# Patient Record
Sex: MALE | Race: WHITE
[De-identification: names, ages, dates, MRNs, and addresses within clinical notes are randomized per-mention and may not be internally consistent; named-entity substitution may affect disease eponyms.]

---

## 2021-12-30 ENCOUNTER — HOSPITAL ENCOUNTER (EMERGENCY)
Dept: HOSPITAL 43 - DL.ED | Age: 71
Discharge: NURSING, REHAB OR LONG-TERM CARE | End: 2021-12-30
Payer: MEDICARE

## 2021-12-30 DIAGNOSIS — S68.111A: Primary | ICD-10-CM

## 2021-12-30 DIAGNOSIS — Z23: ICD-10-CM
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
## 2021-12-30 NOTE — CR
PROCEDURE INFORMATION: 

Exam: XR Left Finger(s) 

Exam date and time: 12/30/2021 7:57 AM 

Age: 71 years old 

Clinical indication: Got finger caught in a  



TECHNIQUE: 

Imaging protocol: XR Left fingers. 

Views: Minimum 2 views. 



COMPARISON: 

No relevant prior studies available. 



FINDINGS: 

Bones/joints: There is been amputation of the tip of the index finger with loss 

of the distal aspect of the distal phalanx and associated soft tissue. There is 

multifocal osteoarthritis. 

Soft tissues: See above. 



IMPRESSION: 

1. Amputation of the distal index finger. 

2. Osteoarthritis.

## 2021-12-30 NOTE — EDM.PDOC
ED HPI GENERAL MEDICAL PROBLEM





- General


Stated Complaint: CUT ENTIRE TOP OF FINGER OFF


Time Seen by Provider: 12/30/21 07:50


Source of Information: Reports: Patient


History Limitations: Reports: No Limitations





- History of Present Illness


INITIAL COMMENTS - FREE TEXT/NARRATIVE: 





This 72 yo male patient reports to the ED due to getting his left 2nd finger 

caught in the . The patient was grinding deer meat to make sausage 

today. The patient noticed that there was a piece of meat caught in the .

As he was attempting to get the piece of meat to go through the , his 

finger got caught. The patient had the wound covered with the bleeding 

controlled upon arrival in the ED. 


Onset: Today


Duration: Minutes:


Location: Reports: Upper Extremity, Left


Quality: Reports: Ache, Dull


Severity: Moderate


Improves with: Reports: None


Worsens with: Reports: None


Context: Reports: Activity





- Related Data


                                    Allergies











Allergy/AdvReac Type Severity Reaction Status Date / Time


 


No Known Allergies Allergy   Verified 12/30/21 07:59











Home Meds: 


                                    Home Meds





. [Unable to Verify Home Med List]  12/30/21 [History]











Review of Systems





- Review of Systems


Review Of Systems: Comprehensive ROS is negative, except as noted in HPI.





ED EXAM, GENERAL





- Physical Exam


Exam: See Below


Exam Limited By: No Limitations


General Appearance: Alert, WD/WN, Mild Distress


Eye Exam: Bilateral Eye: EOMI, Normal Inspection, PERRL


Ears: Normal External Exam, Normal Canal, Hearing Grossly Normal, Normal TMs


Nose: Normal Inspection, Normal Mucosa, No Blood


Throat/Mouth: Normal Inspection, Normal Lips, Normal Teeth, Normal Gums, Normal 

Oropharynx, Normal Voice, No Airway Compromise


Head: Atraumatic, Normocephalic


Neck: Normal Inspection, Supple, Non-Tender, Full Range of Motion


Respiratory/Chest: No Respiratory Distress, Lungs Clear, Normal Breath Sounds, 

No Accessory Muscle Use, Chest Non-Tender


Cardiovascular: Normal Peripheral Pulses, Regular Rate, Rhythm, No Edema, No 

Gallop, No JVD, No Murmur, No Rub


GI/Abdominal: Normal Bowel Sounds


 (Male) Exam: Deferred


Rectal (Males) Exam: Deferred


Back Exam: Normal Inspection, Full Range of Motion, NT


Extremities: Arm Pain (left distal 2nd finger amputation)


Neurological: Alert, Oriented, CN II-XII Intact, Normal Cognition, Normal Gait, 

Normal Reflexes, No Motor/Sensory Deficits


Psychiatric: Normal Affect, Normal Mood


Skin Exam: Other (Amputation of left distal 2nd finger. )


Lymphatic: No Adenopathy





Course





- Vital Signs


Last Recorded V/S: 


                                Last Vital Signs











Temp  97.3 F   12/30/21 07:45


 


Pulse  84   12/30/21 07:45


 


Resp  18   12/30/21 07:45


 


BP  127/99 H  12/30/21 07:45


 


Pulse Ox  98   12/30/21 07:45














- Orders/Labs/Meds


Orders: 


                               Active Orders 24 hr











 Category Date Time Status


 


 Vaccine to be Administered/Admin Charge [RC] ASDIRECTED Care  12/30/21 07:59 

Ordered











Meds: 


Medications














Discontinued Medications














Generic Name Dose Route Start Last Admin





  Trade Name Freq  PRN Reason Stop Dose Admin


 


Diphtheria/Tetanus/Acell Pertussis  0.5 ml  12/30/21 07:59  12/30/21 08:21





  Diphtheria,Pertussis(Acell),Tetanus Vaccine 0.5 Ml Syringe  IM  12/30/21 08:00

  0.5 ml





  .ONCE ONE   Administration














Departure





- Departure


Time of Disposition: 08:47


Disposition: DC/Tfer to [facility] 02


Condition: Fair


Clinical Impression: 


Amputation of finger of left hand


Qualifiers:


 Encounter type: initial encounter Qualified Code(s): S68.119A - Complete 

traumatic metacarpophalangeal amputation of unspecified finger, initial 

encounter








- Discharge Information


*PRESCRIPTION DRUG MONITORING PROGRAM REVIEWED*: Not Applicable


*COPY OF PRESCRIPTION DRUG MONITORING REPORT IN PATIENT [facility]: Not Applicable


Care Plan Goals: 


Discussed the patient's history, examination and x-ray results with Dr. Gresham 

(Ortho with [facility]) via [facility]. Dr. Gresham accepted the patient for continued 

evaluation and management through the ED at [facility]. The patient will be 

transported by by private vehicle.





Sepsis Event Note (ED)





- Focused Exam


Vital Signs: 


                                   Vital Signs











  Temp Pulse Resp BP Pulse Ox


 


 12/30/21 07:45  97.3 F  84  18  127/99 H  98














- My Orders


Last 24 Hours: 


My Active Orders





12/30/21 07:59


Vaccine to be Administered/Admin Charge [RC] ASDIRECTED 














- Assessment/Plan


Last 24 Hours: 


My Active Orders





12/30/21 07:59


Vaccine to be Administered/Admin Charge [RC] ASDIRECTED